# Patient Record
Sex: FEMALE | Race: WHITE | NOT HISPANIC OR LATINO
[De-identification: names, ages, dates, MRNs, and addresses within clinical notes are randomized per-mention and may not be internally consistent; named-entity substitution may affect disease eponyms.]

---

## 2023-02-27 ENCOUNTER — APPOINTMENT (OUTPATIENT)
Dept: ULTRASOUND IMAGING | Facility: CLINIC | Age: 58
End: 2023-02-27
Payer: COMMERCIAL

## 2023-02-27 PROCEDURE — 76830 TRANSVAGINAL US NON-OB: CPT

## 2023-06-11 ENCOUNTER — EMERGENCY (EMERGENCY)
Facility: HOSPITAL | Age: 58
LOS: 1 days | Discharge: HOME | End: 2023-06-11
Attending: EMERGENCY MEDICINE | Admitting: EMERGENCY MEDICINE
Payer: COMMERCIAL

## 2023-06-11 VITALS
HEART RATE: 94 BPM | TEMPERATURE: 98 F | DIASTOLIC BLOOD PRESSURE: 83 MMHG | SYSTOLIC BLOOD PRESSURE: 175 MMHG | OXYGEN SATURATION: 98 % | RESPIRATION RATE: 18 BRPM

## 2023-06-11 VITALS
HEIGHT: 67 IN | HEART RATE: 100 BPM | WEIGHT: 289.91 LBS | SYSTOLIC BLOOD PRESSURE: 174 MMHG | RESPIRATION RATE: 18 BRPM | TEMPERATURE: 98 F | OXYGEN SATURATION: 99 % | DIASTOLIC BLOOD PRESSURE: 84 MMHG

## 2023-06-11 PROCEDURE — 99285 EMERGENCY DEPT VISIT HI MDM: CPT

## 2023-06-11 PROCEDURE — 73130 X-RAY EXAM OF HAND: CPT | Mod: 26,LT

## 2023-06-11 PROCEDURE — 73110 X-RAY EXAM OF WRIST: CPT | Mod: 26

## 2023-06-11 RX ORDER — TETANUS TOXOID, REDUCED DIPHTHERIA TOXOID AND ACELLULAR PERTUSSIS VACCINE, ADSORBED 5; 2.5; 8; 8; 2.5 [IU]/.5ML; [IU]/.5ML; UG/.5ML; UG/.5ML; UG/.5ML
0.5 SUSPENSION INTRAMUSCULAR ONCE
Refills: 0 | Status: COMPLETED | OUTPATIENT
Start: 2023-06-11 | End: 2023-06-11

## 2023-06-11 RX ORDER — TRAMADOL HYDROCHLORIDE 50 MG/1
50 TABLET ORAL ONCE
Refills: 0 | Status: DISCONTINUED | OUTPATIENT
Start: 2023-06-11 | End: 2023-06-11

## 2023-06-11 RX ORDER — AMPICILLIN SODIUM AND SULBACTAM SODIUM 250; 125 MG/ML; MG/ML
3 INJECTION, POWDER, FOR SUSPENSION INTRAMUSCULAR; INTRAVENOUS ONCE
Refills: 0 | Status: COMPLETED | OUTPATIENT
Start: 2023-06-11 | End: 2023-06-11

## 2023-06-11 RX ORDER — LIDOCAINE HCL 20 MG/ML
20 VIAL (ML) INJECTION ONCE
Refills: 0 | Status: COMPLETED | OUTPATIENT
Start: 2023-06-11 | End: 2023-06-11

## 2023-06-11 RX ADMIN — Medication 20 MILLILITER(S): at 20:48

## 2023-06-11 RX ADMIN — TETANUS TOXOID, REDUCED DIPHTHERIA TOXOID AND ACELLULAR PERTUSSIS VACCINE, ADSORBED 0.5 MILLILITER(S): 5; 2.5; 8; 8; 2.5 SUSPENSION INTRAMUSCULAR at 21:12

## 2023-06-11 RX ADMIN — AMPICILLIN SODIUM AND SULBACTAM SODIUM 200 GRAM(S): 250; 125 INJECTION, POWDER, FOR SUSPENSION INTRAMUSCULAR; INTRAVENOUS at 23:20

## 2023-06-11 RX ADMIN — TRAMADOL HYDROCHLORIDE 50 MILLIGRAM(S): 50 TABLET ORAL at 20:50

## 2023-06-11 NOTE — ED PROVIDER NOTE - NS ED ROS FT
MS: See HPI  Neuro: No numbness or weakness. No LOC.  Except as documented in the HPI, all other systems are negative.

## 2023-06-11 NOTE — ED PROVIDER NOTE - CLINICAL SUMMARY MEDICAL DECISION MAKING FREE TEXT BOX
L wrist fx/dislocation. NVI. Analgesia, ortho consult. Anticipate reduction L wrist fx/dislocation, possibly open given small puncture wound. NVI. Analgesia, abx, ortho consult. Anticipate reduction. Dispo pending ortho recommendations

## 2023-06-11 NOTE — ED ADULT NURSE NOTE - NSFALLRISKINTERV_ED_ALL_ED

## 2023-06-11 NOTE — ED PROVIDER NOTE - PROGRESS NOTE DETAILS
Ortho consulted for wrist fx/dislocation. Images  being uploaded into PACS Images unable to be uploaded at this time. ortho requesting repeat images here. Kanu ralph cleared for DC on PO abx.  states not an open fx.  follow up within 1 week.

## 2023-06-11 NOTE — ED PROVIDER NOTE - PATIENT PORTAL LINK FT
You can access the FollowMyHealth Patient Portal offered by [facility] by registering at the following website: [email or web address]. By joining [employer]’s FollowMyHealth portal, you will also be able to view your health information using other applications (apps) compatible with our system.

## 2023-06-11 NOTE — ED ADULT NURSE NOTE - OBJECTIVE STATEMENT
Pt is a 59 y/o female A&OX4 in NAD ambulatory with steady gait sent in by  for confirmed fracture to left wrist. Pt reports fall while playing pickle ball and wound left wrist from "watch breaking." Pt requesting TDAP. Wrist wrapped per . Pt denies head injury, LOC.

## 2023-06-11 NOTE — CONSULT NOTE ADULT - SUBJECTIVE AND OBJECTIVE BOX
Orthopaedic Surgery Consult Note    For Surgeon: Dr. Ragland  Patient is a 58yF  RHD Female  who presents with distal radius fracture sustained after ground level mechanical fall    Patient is . Reports    Pt presents today with symptoms. Denies HS/LOC. C/o persistent wrist pain without any new weakness/n/t/p. No other MSK complaints.    PMHx -  PSx -  Social (-)Tobacco (-)EtOh (-)Elicit substance      Vital Signs Last 24 Hrs  T(C): 36.7 (11 Jun 2023 20:09), Max: 36.7 (11 Jun 2023 20:09)  T(F): 98 (11 Jun 2023 20:09), Max: 98 (11 Jun 2023 20:09)  HR: 100 (11 Jun 2023 20:09) (100 - 100)  BP: 174/84 (11 Jun 2023 20:09) (174/84 - 174/84)  BP(mean): --  RR: 18 (11 Jun 2023 20:09) (18 - 18)  SpO2: 99% (11 Jun 2023 20:09) (99% - 99%)    Parameters below as of 11 Jun 2023 20:09  Patient On (Oxygen Delivery Method): room air        Physical Exam:  AVSS    Gen: NAD, AOx3    RUE  Skin intact no open injuries  +ttp, swelling, ecchymosis over the L arm  Painless ROM of all other extremities. No elbow, arm, shoulder pain on injured side.  AIN/PIN/U/intact to motor  SILT M/R/U/Ax  Palpable radial and ulnar pulses  Brisk cap refill distally      Imaging:       A/P: 58yFemale with R distal radius  fracture. No n/v deficits. Placed in a sugar tong splint. Told this is likely operative.    - please contact Ortho when repeat  XRs post-reduction are done before dispo  - sling for comfort  - NWB RUE,- no lifting  - Ice as able for swelling  - PO Pain Rx per ED, no NSAIDs  - Follow up with orthopaedics within 1 week.    -Discussed with Dr. Collazo Pager [phone]      ******TEMPLATE NOT COMPLETE********

## 2023-06-11 NOTE — ED PROVIDER NOTE - CARE PROVIDER_API CALL
Sweta Alcala  Orthopaedic Surgery  [address]  [place] [address]  Phone: [phone]  Fax: [phone]  Follow Up Time: 4-6 Days

## 2023-06-11 NOTE — ED PROVIDER NOTE - PHYSICAL EXAMINATION
Constitutional: Well appearing, awake, alert, oriented to person, place, time/situation and in no apparent distress.  ENMT: Airway patent.   Eyes: Clear bilaterally  Cardiac: Normal rate, regular rhythm.   Respiratory: No increased WOB, tachypnea, hypoxia, or accessory mm use. Pt speaks in full sentences.   Musculoskeletal: + L wrist deformity w/ dec ROM, ttp over the radius and ulna. median / ulnar / radial nn intact. sensation intact. 2+ radial pulses. fingers / elbow / shoulder FROM  Neuro: Alert and oriented x 3, face symmetric and speech fluent. Nml gross motor movement, grossly non focal   Skin: Skin normal color for race, warm, dry and intact. No evidence of rash.  Psych: Alert and oriented to person, place, time/situation. normal mood and affect. no apparent risk to self or others. Constitutional: Well appearing, awake, alert, oriented to person, place, time/situation and in no apparent distress.  ENMT: Airway patent.   Eyes: Clear bilaterally  Cardiac: Normal rate, regular rhythm.   Respiratory: No increased WOB, tachypnea, hypoxia, or accessory mm use. Pt speaks in full sentences.   Musculoskeletal: + L wrist deformity w/ dec ROM, ttp over the radius and ulna. median / ulnar / radial nn intact. sensation intact. 2+ radial pulses. fingers / elbow / shoulder FROM  Neuro: Alert and oriented x 3, face symmetric and speech fluent. Nml gross motor movement, grossly non focal   Skin: Skin normal color for race, warm, dry. small 1mm puncture wound overlying ulnar aspect of wrist. minimal active bleeding  Psych: Alert and oriented to person, place, time/situation. normal mood and affect. no apparent risk to self or others.

## 2023-06-11 NOTE — ED PROVIDER NOTE - OBJECTIVE STATEMENT
Pt w/ PMHx HTN, anxiety, PSHx L scaphoid pin @ [facility], is RHD presents s/p backwards FOOSH on L, w/ reported fx from UC. No numbness/tingling / weakness

## 2023-06-12 PROCEDURE — 73110 X-RAY EXAM OF WRIST: CPT | Mod: 26,LT

## 2023-06-12 PROCEDURE — 90471 IMMUNIZATION ADMIN: CPT

## 2023-06-12 PROCEDURE — 73130 X-RAY EXAM OF HAND: CPT

## 2023-06-12 PROCEDURE — 36415 COLL VENOUS BLD VENIPUNCTURE: CPT

## 2023-06-12 PROCEDURE — 73110 X-RAY EXAM OF WRIST: CPT

## 2023-06-12 PROCEDURE — 90715 TDAP VACCINE 7 YRS/> IM: CPT

## 2023-06-12 PROCEDURE — 96374 THER/PROPH/DIAG INJ IV PUSH: CPT | Mod: XU

## 2023-06-12 PROCEDURE — 99285 EMERGENCY DEPT VISIT HI MDM: CPT | Mod: 25

## 2023-06-12 PROCEDURE — 25605 CLTX DST RDL FX/EPHYS SEP W/: CPT | Mod: LT

## 2023-06-12 RX ORDER — OXYCODONE AND ACETAMINOPHEN 5; 325 MG/1; MG/1
1 TABLET ORAL
Qty: 12 | Refills: 0
Start: 2023-06-12

## 2023-06-14 DIAGNOSIS — M25.532 PAIN IN LEFT WRIST: ICD-10-CM

## 2023-06-14 DIAGNOSIS — F41.9 ANXIETY DISORDER, UNSPECIFIED: ICD-10-CM
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
## 2023-06-14 DIAGNOSIS — I10 ESSENTIAL (PRIMARY) HYPERTENSION: ICD-10-CM

## 2023-06-14 DIAGNOSIS — S52.592A OTHER FRACTURES OF LOWER END OF LEFT RADIUS, INITIAL ENCOUNTER FOR CLOSED FRACTURE: ICD-10-CM

## 2023-06-14 DIAGNOSIS — Z23 ENCOUNTER FOR IMMUNIZATION: ICD-10-CM

## 2023-06-14 DIAGNOSIS — S52.612A DISPLACED FRACTURE OF LEFT ULNA STYLOID PROCESS, INITIAL ENCOUNTER FOR CLOSED FRACTURE: ICD-10-CM
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
## 2024-01-16 ENCOUNTER — APPOINTMENT (OUTPATIENT)
Dept: ULTRASOUND IMAGING | Facility: CLINIC | Age: 59
End: 2024-01-16
Payer: COMMERCIAL

## 2024-01-16 PROCEDURE — 76830 TRANSVAGINAL US NON-OB: CPT

## 2024-01-20 ENCOUNTER — TRANSCRIPTION ENCOUNTER (OUTPATIENT)
Age: 59
End: 2024-01-20

## 2025-01-28 PROBLEM — Z00.00 ENCOUNTER FOR PREVENTIVE HEALTH EXAMINATION: Status: ACTIVE | Noted: 2025-01-28

## 2025-01-30 ENCOUNTER — APPOINTMENT (OUTPATIENT)
Dept: ULTRASOUND IMAGING | Facility: CLINIC | Age: 60
End: 2025-01-30
Payer: COMMERCIAL

## 2025-01-30 PROCEDURE — 76830 TRANSVAGINAL US NON-OB: CPT

## 2025-06-12 NOTE — ED PROVIDER NOTE - NSCAREINITIATED _GEN_ER
Patient is awake, alert, and appropriate. Patient resting in stretcher. Patient received IV Morphine and Toradol for pain. Receiving Ceftriaxone. ED MD at bedside to update family on plan. Patient remains on 2L oxygen via face mask. Patient remains on continuos pulse oximetry and cardiac monitoring. Safety measures maintained. Sumaya Krueger(Attending)